# Patient Record
Sex: MALE | Race: WHITE
[De-identification: names, ages, dates, MRNs, and addresses within clinical notes are randomized per-mention and may not be internally consistent; named-entity substitution may affect disease eponyms.]

---

## 2017-07-03 ENCOUNTER — HOSPITAL ENCOUNTER (OUTPATIENT)
Dept: HOSPITAL 33 - ED | Age: 77
Setting detail: OBSERVATION
LOS: 2 days | Discharge: HOME | End: 2017-07-05
Attending: INTERNAL MEDICINE | Admitting: INTERNAL MEDICINE
Payer: MEDICARE

## 2017-07-03 DIAGNOSIS — N28.9: ICD-10-CM

## 2017-07-03 DIAGNOSIS — R53.1: ICD-10-CM

## 2017-07-03 DIAGNOSIS — R10.9: Primary | ICD-10-CM

## 2017-07-03 DIAGNOSIS — I10: ICD-10-CM

## 2017-07-03 DIAGNOSIS — M19.90: ICD-10-CM

## 2017-07-03 DIAGNOSIS — K21.9: ICD-10-CM

## 2017-07-03 DIAGNOSIS — N30.00: ICD-10-CM

## 2017-07-03 DIAGNOSIS — Z79.01: ICD-10-CM

## 2017-07-03 DIAGNOSIS — Z79.899: ICD-10-CM

## 2017-07-03 DIAGNOSIS — I48.91: ICD-10-CM

## 2017-07-03 DIAGNOSIS — Z85.038: ICD-10-CM

## 2017-07-03 LAB
ALBUMIN SERPL-MCNC: 4 G/DL (ref 3.4–5)
ALP SERPL-CCNC: 164 U/L (ref 46–116)
ALT SERPL-CCNC: 24 U/L (ref 12–78)
ANION GAP SERPL CALC-SCNC: 18.9 MEQ/L (ref 5–15)
APTT PPP: 38.4 SECONDS (ref 24.1–36.1)
AST SERPL QL: 20 U/L (ref 15–37)
BACTERIA UR CULT: YES
BASOPHILS NFR BLD AUTO: 0.3 % (ref 0–0.4)
BILIRUB BLD-MCNC: 0.8 MG/DL (ref 0.2–1)
BUN SERPL-MCNC: 23 MG/DL (ref 9–20)
CHLORIDE SERPL-SCNC: 104 MEQ/L (ref 98–107)
CO2 SERPL-SCNC: 25.5 MEQ/L (ref 21–32)
COLLECTION TYPE: (no result)
COMPLETE URINE MICROSCOPIC?: YES
GLUCOSE SERPL-MCNC: 146 MG/DL (ref 70–110)
GLUCOSE UR-MCNC: NEGATIVE MG/DL
INR PPP: 1.33 (ref 0.8–3)
LIPASE SERPL-CCNC: 118 U/L (ref 73–393)
MCH RBC QN AUTO: 29.5 PG (ref 26–32)
NEUTROPHILS NFR BLD AUTO: 56.1 % (ref 36–66)
PLATELET # BLD AUTO: 212 K/MM3 (ref 150–450)
POTASSIUM SERPLBLD-SCNC: 4.5 MEQ/L (ref 3.5–5.1)
PROT SERPL-MCNC: 7.8 GM/DL (ref 6.4–8.2)
PROTHROMBIN TIME: 15.1 SECONDS (ref 8.83–12.87)
RBC # BLD AUTO: 5.79 M/MM3 (ref 4.1–5.6)
SODIUM SERPL-SCNC: 144 MEQ/L (ref 136–145)
TROPONIN T SERPL HS-MCNC: < 0.017 NG/ML (ref 0–0.06)
WBC # BLD AUTO: 7.1 K/MM3 (ref 4–10.5)

## 2017-07-03 PROCEDURE — 71010: CPT

## 2017-07-03 PROCEDURE — 96374 THER/PROPH/DIAG INJ IV PUSH: CPT

## 2017-07-03 PROCEDURE — 93005 ELECTROCARDIOGRAM TRACING: CPT

## 2017-07-03 PROCEDURE — 74176 CT ABD & PELVIS W/O CONTRAST: CPT

## 2017-07-03 PROCEDURE — 96365 THER/PROPH/DIAG IV INF INIT: CPT

## 2017-07-03 PROCEDURE — 80053 COMPREHEN METABOLIC PANEL: CPT

## 2017-07-03 PROCEDURE — 85730 THROMBOPLASTIN TIME PARTIAL: CPT

## 2017-07-03 PROCEDURE — 81000 URINALYSIS NONAUTO W/SCOPE: CPT

## 2017-07-03 PROCEDURE — 85027 COMPLETE CBC AUTOMATED: CPT

## 2017-07-03 PROCEDURE — 82272 OCCULT BLD FECES 1-3 TESTS: CPT

## 2017-07-03 PROCEDURE — 86901 BLOOD TYPING SEROLOGIC RH(D): CPT

## 2017-07-03 PROCEDURE — 96360 HYDRATION IV INFUSION INIT: CPT

## 2017-07-03 PROCEDURE — 85610 PROTHROMBIN TIME: CPT

## 2017-07-03 PROCEDURE — 93268 ECG RECORD/REVIEW: CPT

## 2017-07-03 PROCEDURE — 36000 PLACE NEEDLE IN VEIN: CPT

## 2017-07-03 PROCEDURE — 84484 ASSAY OF TROPONIN QUANT: CPT

## 2017-07-03 PROCEDURE — 86900 BLOOD TYPING SEROLOGIC ABO: CPT

## 2017-07-03 PROCEDURE — 36415 COLL VENOUS BLD VENIPUNCTURE: CPT

## 2017-07-03 PROCEDURE — 85025 COMPLETE CBC W/AUTO DIFF WBC: CPT

## 2017-07-03 PROCEDURE — 86850 RBC ANTIBODY SCREEN: CPT

## 2017-07-03 PROCEDURE — 87040 BLOOD CULTURE FOR BACTERIA: CPT

## 2017-07-03 PROCEDURE — 87086 URINE CULTURE/COLONY COUNT: CPT

## 2017-07-03 PROCEDURE — 83690 ASSAY OF LIPASE: CPT

## 2017-07-03 PROCEDURE — 83605 ASSAY OF LACTIC ACID: CPT

## 2017-07-03 PROCEDURE — 83880 ASSAY OF NATRIURETIC PEPTIDE: CPT

## 2017-07-03 PROCEDURE — 99285 EMERGENCY DEPT VISIT HI MDM: CPT

## 2017-07-03 RX ADMIN — SIMVASTATIN SCH MG: 20 TABLET, FILM COATED ORAL at 22:04

## 2017-07-03 RX ADMIN — AMPICILLIN SODIUM AND SULBACTAM SODIUM SCH MLS/HR: 1; .5 INJECTION, POWDER, FOR SOLUTION INTRAMUSCULAR; INTRAVENOUS at 17:19

## 2017-07-03 RX ADMIN — HYDROCODONE BITARTRATE AND ACETAMINOPHEN PRN TAB: 5; 325 TABLET ORAL at 22:05

## 2017-07-03 RX ADMIN — APIXABAN SCH MG: 5 TABLET, FILM COATED ORAL at 22:04

## 2017-07-03 RX ADMIN — FAMOTIDINE SCH MG: 10 INJECTION INTRAVENOUS at 22:07

## 2017-07-03 NOTE — XRAY
Indication: Upper abdominal pain and weakness pain



Multiple contiguous axial images obtained through the abdomen and pelvis

without contrast as ordered.



Comparison: September 22, 2015.



Lung bases again demonstrates minimal bibasilar dependent atelectasis and left

costophrenic angle calcified granuloma.  There remains also large hiatal

hernia with partial intrathoracic stomach.  Heart is not enlarged.



Noncontrasted stomach and bowel loops appear nonobstructed.  There remains

diffuse scattered colonic diverticulosis without diverticulitis.  No free

fluid/air.  Interval enlarging left mid renal cortical cyst today 2.1 cm,

previously 1.1 cm.  Stable scattered hepatic/splenic calcified granulomas,

subcentimeter right lobe hepatic cyst, benign chunky prostate calcifications,

and cholecystectomy.



Remaining liver, pancreas, spleen, adrenal glands, kidneys, ureters, and

bladder appear unremarkable for noncontrast exam.  There remains extensive

aortoiliac calcifications and 4.4 cm distal AAA.



Osseous structures again demonstrates right total hip arthroplasty, mild

degenerative changes throughout the spine, bilateral L5 spondylolysis with

minimal grade 1 spondylolisthesis, and mixed lytic/sclerotic appearing sacrum.



Impression:

1.  Interval enlarging 2.1 cm left renal cortical cyst.  Ultrasound may yield

further information if there remains further clinical concern.

2.  Stable large hiatal hernia with partial intrathoracic stomach, diffuse

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

disease.

3.  Stable L5 spondylolysis with minimal spondylolisthesis.

3.  Stable mixed lytic/sclerotic appearing sacrum.  Again metastasis not

completely excluded in this patient with previous reported history of

colorectal cancer.



CT DI 22.27

## 2017-07-03 NOTE — ERPHSYRPT
- History of Present Illness


Time Seen by Provider: 07/03/17 08:47


Source: patient, family (wife)


Patient Subjective Stated Complaint: PT STATES HE HAS BEEN FEELING WEAK FOR THE 

PAST FEW DAYS. SOB WITH EXERTION. DENIES ANY COUGH.


Triage Nursing Assessment: PT PALE, WARM, DRY. LUNG SOUNDS DIMINISHED. PT 

AFEBRILE. ARRIVED IN WHEELCHAIR, TRANSFRED TO er COT WITHOUT DIFFICULTY.


Physician History: 





CC: weakness


Hx: 77 y/o patient of Dr Chamorro. He has felt general weakness for one week. 

Worse when upright and feels like might pass out. He has chronic black stools 

and takes iron. Hx of colon CA with recent normal colonoscopy. Prior renal 

insuff and AAA. Some abd pain. No vomiting. No chest pain. No fever or chills. 

He feels tired and fatigue. He is on eliquis for atrial fibrillation.





Severity: moderate


Associated Symptoms: shortness of breath


Allergies/Adverse Reactions: 








No Known Drug Allergies Allergy (Verified 07/03/17 09:04)


 





Home Medications: 








Fentanyl 25 mcg TOP Q72H 10/01/14 [History]


Hydrocodone Bit/Acetaminophen [Norco 5-325 Tablet] 1 tab PO Q4-6HPRN PRN 10/01/

14 [History]


Apixaban*** [Eliquis***] 5 mg PO BID 09/23/15 [History]


Ferrous Sulfate [Iron] 325 mg PO DAILY 09/23/15 [History]


Furosemide 40 mg PO DAILY 09/23/15 [History]


Amlodipine Besylate [Norvasc] 2.5 mg PO DAILY 07/03/17 [History]


Metoprolol Tartrate 25 mg*** [Lopressor 25MG Tab***] 25 mg PO DAILY 07/03/17 [

History]


Omeprazole 20 MG [Prilosec 20 mg] 20 mg PO DAILY 07/03/17 [History]


Rosuvastatin Calcium [Crestor] 10 mg PO HS 07/03/17 [History]


Valsartan [Diovan] 80 mg PO DAILY 07/03/17 [History]





Hx Tetanus, Diphtheria Vaccination/Date Given: Yes (UP TO DATE)


Hx Influenza Vaccination/Date Given: Yes


Hx Pneumococcal Vaccination/Date Given: Yes


Immunizations Up to Date: Yes





- Review of Systems


Constitutional: Fatigue, Malaise, Weakness, No Fever, No Chills


Eyes: No Symptoms


Ears, Nose, & Throat: No Symptoms


Respiratory: Dyspnea, No Cough


Cardiac: No Chest Pain, No Syncope


Abdominal/Gastrointestinal: Abdominal Pain, No Nausea, No Vomiting, No Diarrhea


Genitourinary Symptoms: No Dysuria


Musculoskeletal: No Back Pain


Skin: No Rash


Neurological: Dizziness, No Focal Weakness, No Headache, No Parasthesia


All Other Systems: Reviewed and Negative





- Past Medical History


Pertinent Past Medical History: Yes


Neurological History: No Pertinent History


ENT History: No Pertinent History


Cardiac History: Arrhythmia, High Cholesterol, Hypertension


Respiratory History: No Pertinent History


Endocrine Medical History: No Pertinent History


Musculoskeletal History: Arthritis, Other


GI Medical History: Gallbladder Disease, Other


 History: Renal Disease


Psycho-Social History: No Pertinent History


Male Reproductive Disorders: No Pertinent History


Other Medical History: ANEMIA,FX NECK, hip replacement, fused rt hip, fused rt 

ankle





- Past Surgical History


Past Surgical History: Yes


Neuro Surgical History: No Pertinent History


Cardiac: No Pertinent History


Respiratory: No Pertinent History


Gastrointestinal: Cholecystectomy, Hernia Repair, Other


Genitourinary: No Pertinent History


Musculoskeletal: Orthopedic Surgery


Male Surgical History: Vasectomy


Other Surgical History: HIP AND NECK SURG, latasha drains left after gallbladder 

removed, draining milky substance.





- Social History


Smoking Status: Former smoker


Exposure to second hand smoke: No


Drug Use: none


Patient Lives Alone: No ( here with wife)





- Nursing Vital Signs


Nursing Vital Signs: 


 Initial Vital Signs











Temperature                    99.2 F


 


Temperature Source             Rectal


 


Pulse Rate                     98


 


Respiratory Rate               18


 


Blood Pressure []              118/74


 


Pain Intensity                 0

















- Physical Exam


General Appearance: alert, other (pleasant man, mildly tachycardic when upright

, pale)


Eye Exam: PERRL/EOMI


Ears, Nose, Throat Exam: moist mucous membranes


Neck Exam: normal inspection, non-tender, supple


Respiratory Exam: diminished breath sounds


Cardiovascular Exam: irregular, No edema


Gastrointestinal/Abdomen Exam: soft, tenderness (RLQ, no mass, several scars)


Male Genitalia Exam: normal genitalia


Back Exam: normal inspection, No vertebral tenderness


Extremity Exam: normal inspection, normal range of motion


Neurologic Exam: alert, oriented x 3, cooperative, CNs II-XII nml as tested, 

sensation nml, No motor deficits


Skin Exam: warm, dry, pale, No rash





- Course


Nursing assessment & vital signs reviewed: Yes


EKG Interpreted by Me: RATE ([id number]), A-fib, NORMAL AXIS, NORMAL INTERVALS (QTc 469

), Non-specific ST Changes, Other (LVH)





- Radiology Exams


  ** cxr


X-ray Interpretation: Teleradiologist Report, Negative





- CT Exams


  ** abd/pelvis


CT Interpretation: Tele-radiologist Report (renal cyst, stable AAA, no acute 

findings)


Ordered Tests: 


 Active Orders 24 hr











 Category Date Time Status


 


 Clean Catch Urine Specimen STAT Care  07/03/17 08:56 Active


 


 EKG-ER Only STAT Care  07/03/17 08:56 Active


 


 IV Insertion STAT Care  07/03/17 08:56 Active


 


 IV Insertion-2nd Peripheral STAT Care  07/03/17 09:07 Active


 


 NPO (ED) STAT Care  07/03/17 08:56 Active


 


 Rectal Temperature STAT Care  07/03/17 08:58 Active


 


 ABDOMEN AND PELVIS W/0 CONTRAS [CT] Stat Exams  07/03/17 08:57 Completed


 


 CHEST 1 VIEW (PORTABLE) Stat Exams  07/03/17 08:57 Completed


 


 BLOOD CULTURE Stat Lab  07/03/17 09:20 Received


 


 CBC W DIFF Stat Lab  07/03/17 08:56 Completed


 


 CMP Stat Lab  07/03/17 09:00 Completed


 


 CULTURE,URINE Stat Lab  07/03/17 10:50 Received


 


 LIPASE Stat Lab  07/03/17 09:00 Completed


 


 Lactic Acid Stat Lab  07/03/17 09:03 Completed


 


 Lactic Acid Stat Lab  07/03/17 11:20 Completed


 


 NT PRO BNP Stat Lab  07/03/17 09:00 Completed


 


 Occult Blood,Stool Other Stat Lab  07/03/17 08:59 Completed


 


 PROTIME WITH INR Stat Lab  07/03/17 09:00 Completed


 


 PTT Stat Lab  07/03/17 09:00 Completed


 


 TROPONIN Stat Lab  07/03/17 09:00 Completed


 


 UA W/ MICROSCOPIC Stat Lab  07/03/17 10:50 Completed








Medication Summary











Generic Name Dose Route Start Last Admin





  Trade Name Freq  PRN Reason Stop Dose Admin


 


Sodium Chloride  1,000 mls @ 100 mls/hr  07/03/17 09:00  07/03/17 09:11





  Sodium Chloride 0.9% 1000 Ml  IV  08/02/17 08:59  100 mls/hr





  .Q10H JARDO   Administration














Discontinued Medications














Generic Name Dose Route Start Last Admin





  Trade Name Freq  PRN Reason Stop Dose Admin


 


Famotidine  20 mg  07/03/17 08:56  07/03/17 09:11





  Pepcid 20 Mg Vial***  IV  07/03/17 08:57  20 mg





  STAT ONE   Administration


 


Famotidine  Confirm  07/03/17 09:07  





  Pepcid 20 Mg Vial***  Administered  07/03/17 09:08  





  Dose   





  20 mg   





  IV   





  .STK-MED ONE   


 


Ampicillin Sodium/Sulbactam Sodium  1.5 gm in 100 mls @ 200 mls/hr  07/03/17 11:

09  07/03/17 11:12





  Unasyn 1.5gm / Nacl 100ml  IV  07/03/17 11:38  200 mls/hr





  STAT STA   Administration


 


Ampicillin Sodium/Sulbactam Sodium  Confirm  07/03/17 11:11  





  Unasyn 1.5gm / Nacl 100ml  Administered  07/03/17 11:12  





  Dose   





  1.5 gm in 100 mls @ ud   





  .ROUTE   





  .STK-MED ONE   











Lab/Rad Data: 


 Laboratory Result Diagrams





 07/03/17 08:56 





 07/03/17 09:00 





 Laboratory Results











  07/03/17 07/03/17 07/03/17 Range/Units





  11:20 10:50 09:03 


 


WBC     (4.0-10.5)  K/mm3


 


RBC     (4.1-5.6)  M/mm3


 


Hgb     (12.5-18.0)  gm/dl


 


Hct     (42-50)  %


 


MCV     ()  fl


 


MCH     (26-32)  pg


 


MCHC     (32-36)  g/dl


 


RDW     (11.5-14.0)  %


 


Plt Count     (150-450)  K/mm3


 


MPV     (6-9.5)  fl


 


Gran %     (36.0-66.0)  %


 


Lymphocytes %     (24.0-44.0)  %


 


Monocytes %     (0.0-12.0)  %


 


Eosinophils %     (0.00-5.0)  %


 


Basophils %     (0.0-0.4)  %


 


Basophils #     (0-0.4)  


 


INR     (0.8-3.0)  


 


APTT     (24.1-36.1)  SECONDS


 


Sodium     (136-145)  mEq/L


 


Potassium     (3.5-5.1)  mEq/L


 


Chloride     ()  mEq/L


 


Carbon Dioxide     (21-32)  mEq/L


 


Anion Gap     (5-15)  MEQ/L


 


BUN     (9-20)  mg/dL


 


Creatinine     (0.55-1.30)  mg/dl


 


Estimated GFR     ML/MIN


 


Glucose     ()  MG/DL


 


Lactic Acid  1.7   3.0 H  (0.4-2.0)  


 


Calcium     (8.5-10.1)  mg/dL


 


Total Bilirubin     (0.2-1.0)  mg/dL


 


AST     (15-37)  U/L


 


ALT     (12-78)  U/L


 


Alkaline Phosphatase     ()  U/L


 


Troponin I     (0.000-0.056)  ng/ml


 


NT-Pro-B Natriuret Pep     (0-450)  pg/ml


 


Serum Total Protein     (6.4-8.2)  gm/dL


 


Albumin     (3.4-5.0)  g/dL


 


Lipase     ()  U/L


 


Ur Collection Type   VOID   


 


Urine Color   DARK YELLOW   (YELLOW)  


 


Urine Appearance   CLEAR   (CLEAR)  


 


Urine pH   7.0   (5-6)  


 


Ur Specific Gravity   1.010   (1.005-1.025)  


 


Urine Protein   2+   (Negative)  


 


Urine Ketones   NEGATIVE   (NEGATIVE)  


 


Urine Blood   5-10   (0-5)  Werner/ul


 


Urine Nitrite   NEGATIVE   (NEGATIVE)  


 


Urine Bilirubin   MODERATE   (NEGATIVE)  


 


Urine Urobilinogen   4   (0-1)  mg/dL


 


Ur Leukocyte Esterase   1+   (NEGATIVE)  


 


Urine Microscopic RBC   0-2   (0-2)  /HPF


 


Urine Microscopic WBC   10-15   (0-5)  /HPF


 


Ur Epithelial Cells   FEW   (FEW)  /HPF


 


Urine Bacteria   FEW   (NEGATIVE)  /HPF


 


Hyaline Casts   0-2   (0-2)  /LPF


 


Urine Mucus   SLIGHT   (NEGATIVE)  /HPF


 


Urine Glucose   NEGATIVE   (NEGATIVE)  mg/dL


 


Stool Occult Blood     (Negative)  


 


Specimen Received   7/3/17 1050   


 


ABO Group     


 


Rh Factor     


 


Antibody Screen     (NEGATIVE)  














  07/03/17 07/03/17 07/03/17 Range/Units





  09:00 09:00 09:00 


 


WBC     (4.0-10.5)  K/mm3


 


RBC     (4.1-5.6)  M/mm3


 


Hgb     (12.5-18.0)  gm/dl


 


Hct     (42-50)  %


 


MCV     ()  fl


 


MCH     (26-32)  pg


 


MCHC     (32-36)  g/dl


 


RDW     (11.5-14.0)  %


 


Plt Count     (150-450)  K/mm3


 


MPV     (6-9.5)  fl


 


Gran %     (36.0-66.0)  %


 


Lymphocytes %     (24.0-44.0)  %


 


Monocytes %     (0.0-12.0)  %


 


Eosinophils %     (0.00-5.0)  %


 


Basophils %     (0.0-0.4)  %


 


Basophils #     (0-0.4)  


 


INR  1.33    (0.8-3.0)  


 


APTT  38.4 H    (24.1-36.1)  SECONDS


 


Sodium    144  (136-145)  mEq/L


 


Potassium    4.5  (3.5-5.1)  mEq/L


 


Chloride    104  ()  mEq/L


 


Carbon Dioxide    25.5  (21-32)  mEq/L


 


Anion Gap    18.9 H  (5-15)  MEQ/L


 


BUN    23 H  (9-20)  mg/dL


 


Creatinine    1.74 H  (0.55-1.30)  mg/dl


 


Estimated GFR    41  ML/MIN


 


Glucose    146 H  ()  MG/DL


 


Lactic Acid     (0.4-2.0)  


 


Calcium    10.1  (8.5-10.1)  mg/dL


 


Total Bilirubin    0.80  (0.2-1.0)  mg/dL


 


AST    20  (15-37)  U/L


 


ALT    24  (12-78)  U/L


 


Alkaline Phosphatase    164 H  ()  U/L


 


Troponin I    < 0.017  (0.000-0.056)  ng/ml


 


NT-Pro-B Natriuret Pep    4700 H  (0-450)  pg/ml


 


Serum Total Protein    7.8  (6.4-8.2)  gm/dL


 


Albumin    4.0  (3.4-5.0)  g/dL


 


Lipase    118  ()  U/L


 


Ur Collection Type     


 


Urine Color     (YELLOW)  


 


Urine Appearance     (CLEAR)  


 


Urine pH     (5-6)  


 


Ur Specific Gravity     (1.005-1.025)  


 


Urine Protein     (Negative)  


 


Urine Ketones     (NEGATIVE)  


 


Urine Blood     (0-5)  Werner/ul


 


Urine Nitrite     (NEGATIVE)  


 


Urine Bilirubin     (NEGATIVE)  


 


Urine Urobilinogen     (0-1)  mg/dL


 


Ur Leukocyte Esterase     (NEGATIVE)  


 


Urine Microscopic RBC     (0-2)  /HPF


 


Urine Microscopic WBC     (0-5)  /HPF


 


Ur Epithelial Cells     (FEW)  /HPF


 


Urine Bacteria     (NEGATIVE)  /HPF


 


Hyaline Casts     (0-2)  /LPF


 


Urine Mucus     (NEGATIVE)  /HPF


 


Urine Glucose     (NEGATIVE)  mg/dL


 


Stool Occult Blood     (Negative)  


 


Specimen Received     


 


ABO Group   O   


 


Rh Factor   POSITIVE   


 


Antibody Screen   NEGATIVE   (NEGATIVE)  














  07/03/17 07/03/17 Range/Units





  08:59 08:56 


 


WBC   7.1  (4.0-10.5)  K/mm3


 


RBC   5.79 H  (4.1-5.6)  M/mm3


 


Hgb   17.1  (12.5-18.0)  gm/dl


 


Hct   52.8 H  (42-50)  %


 


MCV   91.2  ()  fl


 


MCH   29.5  (26-32)  pg


 


MCHC   32.4  (32-36)  g/dl


 


RDW   13.5  (11.5-14.0)  %


 


Plt Count   212  (150-450)  K/mm3


 


MPV   10.6 H  (6-9.5)  fl


 


Gran %   56.1  (36.0-66.0)  %


 


Lymphocytes %   33.9  (24.0-44.0)  %


 


Monocytes %   7.3  (0.0-12.0)  %


 


Eosinophils %   2.4  (0.00-5.0)  %


 


Basophils %   0.3  (0.0-0.4)  %


 


Basophils #   0.02  (0-0.4)  


 


INR    (0.8-3.0)  


 


APTT    (24.1-36.1)  SECONDS


 


Sodium    (136-145)  mEq/L


 


Potassium    (3.5-5.1)  mEq/L


 


Chloride    ()  mEq/L


 


Carbon Dioxide    (21-32)  mEq/L


 


Anion Gap    (5-15)  MEQ/L


 


BUN    (9-20)  mg/dL


 


Creatinine    (0.55-1.30)  mg/dl


 


Estimated GFR    ML/MIN


 


Glucose    ()  MG/DL


 


Lactic Acid    (0.4-2.0)  


 


Calcium    (8.5-10.1)  mg/dL


 


Total Bilirubin    (0.2-1.0)  mg/dL


 


AST    (15-37)  U/L


 


ALT    (12-78)  U/L


 


Alkaline Phosphatase    ()  U/L


 


Troponin I    (0.000-0.056)  ng/ml


 


NT-Pro-B Natriuret Pep    (0-450)  pg/ml


 


Serum Total Protein    (6.4-8.2)  gm/dL


 


Albumin    (3.4-5.0)  g/dL


 


Lipase    ()  U/L


 


Ur Collection Type    


 


Urine Color    (YELLOW)  


 


Urine Appearance    (CLEAR)  


 


Urine pH    (5-6)  


 


Ur Specific Gravity    (1.005-1.025)  


 


Urine Protein    (Negative)  


 


Urine Ketones    (NEGATIVE)  


 


Urine Blood    (0-5)  Werner/ul


 


Urine Nitrite    (NEGATIVE)  


 


Urine Bilirubin    (NEGATIVE)  


 


Urine Urobilinogen    (0-1)  mg/dL


 


Ur Leukocyte Esterase    (NEGATIVE)  


 


Urine Microscopic RBC    (0-2)  /HPF


 


Urine Microscopic WBC    (0-5)  /HPF


 


Ur Epithelial Cells    (FEW)  /HPF


 


Urine Bacteria    (NEGATIVE)  /HPF


 


Hyaline Casts    (0-2)  /LPF


 


Urine Mucus    (NEGATIVE)  /HPF


 


Urine Glucose    (NEGATIVE)  mg/dL


 


Stool Occult Blood  NEGATIVE   (Negative)  


 


Specimen Received    


 


ABO Group    


 


Rh Factor    


 


Antibody Screen    (NEGATIVE)  














- Progress


Progress Note: 





07/03/17 11:57


Pt feels better. LActic improved. Called Dr Chamorro. Will place in obs for abtx 

and IVF. Pt and wife agree.





Discussed with .: Julio


Will see patient in: hospital (observation)


Counseled pt/family regarding: lab results, diagnosis, need for follow-up, rad 

results





- Departure


Time of Disposition: 11:57


Departure Disposition: Observation


Clinical Impression: 


 UTI (urinary tract infection), General weakness, Sepsis





Condition: Fair


Critical Care Time: No


Referrals: 


SHALOM CHAMORRO MD [Primary Care Provider] -

## 2017-07-03 NOTE — XRAY
Indication: Weakness.



Comparison: February 2, 2017.



Portable chest unchanged again hyperinflated with scattered calcified

granuloma and large hiatal hernia.  Heart is not enlarged.  Bony thorax intact

again with osteopenia, degenerative changes, old left clavicle fracture, and

left shoulder surgery.  No new/acute findings.



Impression: Stable nonacute chest with chronic features.

## 2017-07-04 LAB
ALBUMIN SERPL-MCNC: 3.3 G/DL (ref 3.4–5)
ALP SERPL-CCNC: 133 U/L (ref 46–116)
ALT SERPL-CCNC: 13 U/L (ref 12–78)
ANION GAP SERPL CALC-SCNC: 14.8 MEQ/L (ref 5–15)
AST SERPL QL: 13 U/L (ref 15–37)
BASOPHILS NFR BLD AUTO: 0.4 % (ref 0–0.4)
BILIRUB BLD-MCNC: 0.6 MG/DL (ref 0.2–1)
BUN SERPL-MCNC: 17 MG/DL (ref 9–20)
CHLORIDE SERPL-SCNC: 109 MEQ/L (ref 98–107)
CO2 SERPL-SCNC: 24.9 MEQ/L (ref 21–32)
GLUCOSE SERPL-MCNC: 106 MG/DL (ref 70–110)
MCH RBC QN AUTO: 30 PG (ref 26–32)
NEUTROPHILS NFR BLD AUTO: 58.3 % (ref 36–66)
PLATELET # BLD AUTO: 169 K/MM3 (ref 150–450)
POTASSIUM SERPLBLD-SCNC: 4.2 MEQ/L (ref 3.5–5.1)
PROT SERPL-MCNC: 6.2 GM/DL (ref 6.4–8.2)
RBC # BLD AUTO: 4.83 M/MM3 (ref 4.1–5.6)
SODIUM SERPL-SCNC: 145 MEQ/L (ref 136–145)
WBC # BLD AUTO: 5.5 K/MM3 (ref 4–10.5)

## 2017-07-04 RX ADMIN — AMPICILLIN SODIUM AND SULBACTAM SODIUM SCH MLS/HR: 1; .5 INJECTION, POWDER, FOR SOLUTION INTRAMUSCULAR; INTRAVENOUS at 05:22

## 2017-07-04 RX ADMIN — AMPICILLIN SODIUM AND SULBACTAM SODIUM SCH MLS/HR: 1; .5 INJECTION, POWDER, FOR SOLUTION INTRAMUSCULAR; INTRAVENOUS at 23:19

## 2017-07-04 RX ADMIN — FERROUS SULFATE TAB 325 MG (65 MG ELEMENTAL FE) SCH MG: 325 (65 FE) TAB at 09:10

## 2017-07-04 RX ADMIN — HYDROCODONE BITARTRATE AND ACETAMINOPHEN PRN TAB: 5; 325 TABLET ORAL at 21:37

## 2017-07-04 RX ADMIN — SIMVASTATIN SCH MG: 20 TABLET, FILM COATED ORAL at 21:35

## 2017-07-04 RX ADMIN — PANTOPRAZOLE SODIUM SCH MG: 40 TABLET, DELAYED RELEASE ORAL at 09:10

## 2017-07-04 RX ADMIN — VALSARTAN SCH MG: 80 TABLET ORAL at 09:09

## 2017-07-04 RX ADMIN — FUROSEMIDE SCH MG: 40 TABLET ORAL at 09:10

## 2017-07-04 RX ADMIN — FAMOTIDINE SCH MG: 10 INJECTION INTRAVENOUS at 09:16

## 2017-07-04 RX ADMIN — APIXABAN SCH MG: 5 TABLET, FILM COATED ORAL at 09:09

## 2017-07-04 RX ADMIN — APIXABAN SCH MG: 5 TABLET, FILM COATED ORAL at 21:35

## 2017-07-04 RX ADMIN — FAMOTIDINE SCH MG: 10 INJECTION INTRAVENOUS at 21:35

## 2017-07-04 RX ADMIN — AMLODIPINE BESYLATE SCH MG: 5 TABLET ORAL at 09:09

## 2017-07-04 RX ADMIN — AMPICILLIN SODIUM AND SULBACTAM SODIUM SCH MLS/HR: 1; .5 INJECTION, POWDER, FOR SOLUTION INTRAMUSCULAR; INTRAVENOUS at 17:43

## 2017-07-04 RX ADMIN — METOPROLOL TARTRATE SCH MG: 25 TABLET, FILM COATED ORAL at 09:09

## 2017-07-04 RX ADMIN — AMPICILLIN SODIUM AND SULBACTAM SODIUM SCH MLS/HR: 1; .5 INJECTION, POWDER, FOR SOLUTION INTRAMUSCULAR; INTRAVENOUS at 00:09

## 2017-07-04 RX ADMIN — AMPICILLIN SODIUM AND SULBACTAM SODIUM SCH MLS/HR: 1; .5 INJECTION, POWDER, FOR SOLUTION INTRAMUSCULAR; INTRAVENOUS at 11:27
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
## 2017-07-05 VITALS — OXYGEN SATURATION: 95 % | SYSTOLIC BLOOD PRESSURE: 118 MMHG | DIASTOLIC BLOOD PRESSURE: 71 MMHG | HEART RATE: 68 BPM

## 2017-07-05 LAB
ALBUMIN SERPL-MCNC: 3.3 G/DL (ref 3.4–5)
ALP SERPL-CCNC: 133 U/L (ref 46–116)
ALT SERPL-CCNC: 16 U/L (ref 12–78)
ANION GAP SERPL CALC-SCNC: 14.9 MEQ/L (ref 5–15)
AST SERPL QL: 18 U/L (ref 15–37)
BILIRUB BLD-MCNC: 0.6 MG/DL (ref 0.2–1)
BUN SERPL-MCNC: 11 MG/DL (ref 9–20)
CHLORIDE SERPL-SCNC: 109 MEQ/L (ref 98–107)
CO2 SERPL-SCNC: 23.9 MEQ/L (ref 21–32)
GLUCOSE SERPL-MCNC: 105 MG/DL (ref 70–110)
MCH RBC QN AUTO: 29.9 PG (ref 26–32)
PLATELET # BLD AUTO: 157 K/MM3 (ref 150–450)
POTASSIUM SERPLBLD-SCNC: 3.5 MEQ/L (ref 3.5–5.1)
PROT SERPL-MCNC: 6.2 GM/DL (ref 6.4–8.2)
RBC # BLD AUTO: 4.88 M/MM3 (ref 4.1–5.6)
SODIUM SERPL-SCNC: 144 MEQ/L (ref 136–145)
WBC # BLD AUTO: 4.8 K/MM3 (ref 4–10.5)

## 2017-07-05 RX ADMIN — AMPICILLIN SODIUM AND SULBACTAM SODIUM SCH MLS/HR: 1; .5 INJECTION, POWDER, FOR SOLUTION INTRAMUSCULAR; INTRAVENOUS at 05:42

## 2017-07-05 RX ADMIN — METOPROLOL TARTRATE SCH MG: 25 TABLET, FILM COATED ORAL at 10:12

## 2017-07-05 RX ADMIN — FAMOTIDINE SCH MG: 10 INJECTION INTRAVENOUS at 10:15

## 2017-07-05 RX ADMIN — FUROSEMIDE SCH MG: 40 TABLET ORAL at 10:12

## 2017-07-05 RX ADMIN — APIXABAN SCH MG: 5 TABLET, FILM COATED ORAL at 10:11

## 2017-07-05 RX ADMIN — VALSARTAN SCH MG: 80 TABLET ORAL at 10:12

## 2017-07-05 RX ADMIN — PANTOPRAZOLE SODIUM SCH MG: 40 TABLET, DELAYED RELEASE ORAL at 10:12

## 2017-07-05 RX ADMIN — AMLODIPINE BESYLATE SCH MG: 5 TABLET ORAL at 10:12

## 2017-07-05 RX ADMIN — AMPICILLIN SODIUM AND SULBACTAM SODIUM SCH MLS/HR: 1; .5 INJECTION, POWDER, FOR SOLUTION INTRAMUSCULAR; INTRAVENOUS at 11:39

## 2017-07-05 RX ADMIN — HYDROCODONE BITARTRATE AND ACETAMINOPHEN PRN TAB: 5; 325 TABLET ORAL at 10:14

## 2017-07-05 RX ADMIN — FERROUS SULFATE TAB 325 MG (65 MG ELEMENTAL FE) SCH MG: 325 (65 FE) TAB at 10:16

## 2017-07-05 NOTE — PCM.DS
Discharge Summary


Date of Admission: 


07/03/17 12:11





Admitting Physician: 


SHALOM CHAMORRO





Primary Care Provider: 


SHALOM CHAMORRO








Allergies


Allergies





No Known Drug Allergies Allergy (Verified 07/03/17 12:30)


 











Hospital Summary





- Hospital Course


Hospital Course: 








 Chief Complaint





Diagnosis                        Weakness for 2-3 days





 Allergies











Allergy/AdvReac Type Severity Reaction Status Date / Time


 


No Known Drug Allergies Allergy   Verified 07/03/17 12:30








 Vital Signs (Last 24 hours)











  Temp Pulse Resp BP Pulse Ox


 


 07/05/17 12:00  98.3 F  68  18  118/71  95


 


 07/05/17 08:00    20  


 


 07/05/17 07:06  98.1 F  80  20  182/78  96


 


 07/05/17 04:00    18  


 


 07/05/17 03:59  97.9 F  75  18  111/69  94 L


 


 07/05/17 00:00    18  


 


 07/04/17 23:50  97.8 F  69  18  159/80  94 L


 


 07/04/17 20:00    19  


 


 07/04/17 19:46  98.2 F  72  19  137/86  93 L


 


 07/04/17 16:00  98.3 F  78  18  139/77  92 L








 Home Medications











 Medication  Instructions  Recorded  Confirmed  Last Taken  Type


 


Amlodipine Besylate [Norvasc] 2.5 mg PO DAILY 07/03/17 07/03/17 07/03/17 History


 


Metoprolol Tartrate 25 mg*** 25 mg PO DAILY 07/03/17 07/03/17 07/03/17 History





[Lopressor 25MG Tab***]     


 


Omeprazole 20 MG [Prilosec 20 mg] 20 mg PO DAILY 07/03/17 07/03/17 07/03/17 

History


 


Rosuvastatin Calcium [Crestor] 10 mg PO HS 07/03/17 07/03/17 07/02/17 History


 


Valsartan [Diovan] 80 mg PO DAILY 07/03/17 07/03/17 07/03/17 History








 Current Medications











Generic Name Dose Route Start Last Admin





  Trade Name Freq  PRN Reason Stop Dose Admin


 


Acetaminophen  650 mg  07/03/17 12:12  





  Tylenol 325 Mg***  PO  08/02/17 12:11  





  Q4H PRN PRN   





  PAIN AND/OR FEVER   


 


Hydrocodone Bitart/Acetaminophen  1 tab  07/03/17 20:32  07/05/17 10:14





  Norco 5/325 Mg***  PO  07/08/17 20:31  1 tab





  Q4H PRN PRN   Administration





  PAIN   


 


Amlodipine Besylate  2.5 mg  07/04/17 10:00  07/05/17 10:12





  Norvasc 5 Mg***  PO  08/03/17 09:59  2.5 mg





  DAILY JAROD   Administration


 


Apixaban  5 mg  07/03/17 22:00  07/05/17 10:11





  Eliquis***  PO  08/02/17 21:59  5 mg





  BID JAROD   Administration


 


Famotidine  20 mg  07/03/17 22:00  07/05/17 10:15





  Pepcid 20 Mg Vial***  IV  08/02/17 21:59  20 mg





  Q12HT JAROD   Administration


 


Fentanyl  25 mcg  07/04/17 10:00  07/04/17 09:10





  Duragesic 25mcg Patch***  TOP  07/09/17 09:59  25 mcg





  Q3D JAROD   Administration


 


Ferrous Sulfate  325 mg  07/04/17 10:00  07/05/17 10:16





  Feosol 325 Mg***  PO  08/03/17 09:59  325 mg





  DAILY JAROD   Administration


 


Furosemide  40 mg  07/04/17 10:00  07/05/17 10:12





  Lasix 40 Mg***  PO  08/03/17 09:59  40 mg





  DAILY JAROD   Administration


 


Ampicillin Sodium/Sulbactam Sodium  1.5 gm in 100 mls @ 200 mls/hr  07/03/17 18:

00  07/05/17 11:39





  Unasyn 1.5gm / Nacl 100ml  IV  08/02/17 17:59  200 mls/hr





  Q6HT JAROD   Administration


 


Sodium Chloride  1,000 mls @ 100 mls/hr  07/03/17 12:12  07/05/17 05:41





  Sodium Chloride 0.9% 1000 Ml  IV  08/02/17 12:11  100 mls/hr





  .Q10H JAROD   Administration


 


Metoprolol Tartrate  25 mg  07/04/17 10:00  07/05/17 10:12





  Lopressor 25mg Tab***  PO  08/03/17 09:59  25 mg





  DAILY JAROD   Administration


 


Pantoprazole Sodium  40 mg  07/04/17 10:00  07/05/17 10:12





  Protonix 40mg Tablet***  PO  08/03/17 09:59  40 mg





  DAILY JAROD   Administration


 


Simvastatin  20 mg  07/03/17 22:00  07/04/17 21:35





  Zocor 20mg**  PO  08/02/17 21:59  20 mg





  HS JAROD   Administration


 


Valsartan  80 mg  07/04/17 10:00  07/05/17 10:12





  Diovan 80 Mg***  PO  08/03/17 09:59  80 mg





  DAILY JAROD   Administration














Discontinued Medications














Generic Name Dose Route Start Last Admin





  Trade Name Freq  PRN Reason Stop Dose Admin


 


Famotidine  20 mg  07/03/17 08:56  07/03/17 09:11





  Pepcid 20 Mg Vial***  IV  07/03/17 08:57  20 mg





  STAT ONE   Administration


 


Famotidine  Confirm  07/03/17 09:07  





  Pepcid 20 Mg Vial***  Administered  07/03/17 09:08  





  Dose   





  20 mg   





  IV   





  .STK-MED ONE   


 


Sodium Chloride  1,000 mls @ 100 mls/hr  07/03/17 09:00  07/03/17 09:11





  Sodium Chloride 0.9% 1000 Ml  IV  08/02/17 08:59  100 mls/hr





  .Q10H JAROD   Administration


 


Ampicillin Sodium/Sulbactam Sodium  1.5 gm in 100 mls @ 200 mls/hr  07/03/17 11:

09  07/03/17 11:12





  Unasyn 1.5gm / Nacl 100ml  IV  07/03/17 11:38  200 mls/hr





  STAT STA   Administration


 


Ampicillin Sodium/Sulbactam Sodium  Confirm  07/03/17 11:11  





  Unasyn 1.5gm / Nacl 100ml  Administered  07/03/17 11:12  





  Dose   





  1.5 gm in 100 mls @ ud   





  .ROUTE   





  .STK-MED ONE   


 


Sodium Chloride  Confirm  07/03/17 09:08  





  Sodium Chloride 0.9% 1000 Ml  Administered  07/03/17 09:09  





  Dose   





  1,000 mls @ ud   





  .ROUTE   





  .STK-MED ONE   








 Intake & Output (Last 24 hours)











 07/03/17 07/04/17 07/05/17 07/06/17





 11:59 11:59 11:59 11:59


 


Intake Total  7108 2546 240


 


Balance  2498 2546 240


 


Weight 104.326 kg 91.654 kg  








 Microbiology Results (Last 24 hours)





07/03/17 10:50   Urine, Void    - Final


                            NO GROWTH





 Laboratory Results (Last 24 hours)











  07/05/17 07/05/17





  09:54 09:54


 


WBC   4.8


 


RBC   4.88


 


Hgb   14.6


 


Hct   44.2


 


MCV   90.6


 


MCH   29.9


 


MCHC   33.0


 


RDW   12.9


 


Plt Count   157


 


MPV   10.5 H


 


Sodium  144 


 


Potassium  3.5 


 


Chloride  109 H 


 


Carbon Dioxide  23.9 


 


Anion Gap  14.9 


 


BUN  11 


 


Creatinine  1.20 


 


Estimated GFR  > 60 


 


Glucose  105 


 


Calcium  8.7 


 


Total Bilirubin  0.60 


 


AST  18 


 


ALT  16 


 


Alkaline Phosphatase  133 H 


 


Serum Total Protein  6.2 L 


 


Albumin  3.3 L 








 Orders (Last 24 hours)











 Category Date Time Status


 


 Regular Diet Diet  07/05/17 Breakfast Active


 


 CBC Urgent Lab  07/05/17 09:54 Completed


 


 CMP Urgent Lab  07/05/17 09:54 Completed














- Vitals & Intake/Output


Vital Signs: 





 Vital Signs











Temperature  98.3 F   07/05/17 12:00


 


Pulse Rate  68   07/05/17 12:00


 


Respiratory Rate  18   07/05/17 12:00


 


Blood Pressure  118/71   07/05/17 12:00


 


O2 Sat by Pulse Oximetry  95   07/05/17 12:00











Intake & Output: 





 Intake & Output











 07/03/17 07/04/17 07/05/17 07/06/17





 11:59 11:59 11:59 11:59


 


Intake Total  2498 2546 240


 


Balance  2498 2546 240


 


Weight  91.654 kg  














- Lab


Result Diagrams: 


 07/05/17 09:54





 07/05/17 09:54


Lab Results-Last 24 Hrs: 





 Lab Results-Last 24 Hours











  07/05/17 07/05/17 Range/Units





  09:54 09:54 


 


WBC  4.8   (4.0-10.5)  K/mm3


 


RBC  4.88   (4.1-5.6)  M/mm3


 


Hgb  14.6   (12.5-18.0)  gm/dl


 


Hct  44.2   (42-50)  %


 


MCV  90.6   ()  fl


 


MCH  29.9   (26-32)  pg


 


MCHC  33.0   (32-36)  g/dl


 


RDW  12.9   (11.5-14.0)  %


 


Plt Count  157   (150-450)  K/mm3


 


MPV  10.5 H   (6-9.5)  fl


 


Sodium   144  (136-145)  mEq/L


 


Potassium   3.5  (3.5-5.1)  mEq/L


 


Chloride   109 H  ()  mEq/L


 


Carbon Dioxide   23.9  (21-32)  mEq/L


 


Anion Gap   14.9  (5-15)  MEQ/L


 


BUN   11  (9-20)  mg/dL


 


Creatinine   1.20  (0.55-1.30)  mg/dl


 


Estimated GFR   > 60  ML/MIN


 


Glucose   105  ()  MG/DL


 


Calcium   8.7  (8.5-10.1)  mg/dL


 


Total Bilirubin   0.60  (0.2-1.0)  mg/dL


 


AST   18  (15-37)  U/L


 


ALT   16  (12-78)  U/L


 


Alkaline Phosphatase   133 H  ()  U/L


 


Serum Total Protein   6.2 L  (6.4-8.2)  gm/dL


 


Albumin   3.3 L  (3.4-5.0)  g/dL














- Procedures and Test


Procedures and Tests throughout Hospitalization: 





 Therapy Orders & Screens





07/03/17 14:03


ST Screen per Nursing Assess 


   Comment: Protocol Order


   Physician Instructions: Greater than 5 points order ST Admission Screening


   Reason For Exam: Triggered on Admission


   Diagnosis: UTI, Sepsis, Weakness


   CVA/Dyshpagia/Aphasia: No


   Cognitive Deficits: No


   Dehydration/Nutrition Deficit: Yes


   Reflux: Yes


   Oral-Motor Difficulties: No


   Pneumonia: No


   Nursing Home Resident: No


   Total Points: 8














Discharge Exam


General Appearance: no apparent distress, alert


Neurologic Exam: alert, oriented x 3, cooperative, normal mood/affect, nml 

cerebellar function, sensation nml, No motor deficits


Skin Exam: normal color, warm, dry


Eye Exam: PERRL, EOMI, eyes nml inspection


Ears, Nose, Throat Exam: normal ENT inspection, pharynx normal, moist mucous 

membranes


Neck Exam: normal inspection, non-tender, supple, full range of motion


Respiratory Exam: normal breath sounds, lungs clear, No respiratory distress


Cardiovascular Exam: regular rate/rhythm, normal heart sounds


Gastrointestinal/Abdomen Exam: soft, No tenderness, No mass


Extremity Exam: normal inspection, normal range of motion


Back Exam: normal inspection, normal range of motion, No CVA tenderness, No 

vertebral tenderness


Male Genitalia Exam: deferred


Rectal Exam: deferred





Final Diagnosis/Problem List





- Final Discharge Diagnosis/Problem


(1) Abdominal pain


Current Visit: Yes   Status: Resolved   





(2) General weakness


Current Visit: Yes   Status: Resolved   





(3) UTI (urinary tract infection)


Current Visit: Yes   Status: Acute   


Assessment & Plan: 


will discharge home with cipro 500 mg po bid for 7 days








(4) GERD (gastroesophageal reflux disease)


Current Visit: Yes   Status: Chronic   


Assessment & Plan: 


will continue omeprazole and pepcid 20 mg po bid








- Discharge


Discharge Date: 07/05/17


Disposition: Home, Self-Care


Condition: Stable


Prescriptions: 


New


   Ciprofloxacin [Cipro 500 MG***] 500 mg PO BIDAC #15 tablet


   Famotidine 20 mg*** [Pepcid 20 MG***] 20 mg PO BID #60 tablet





Continue


   Hydrocodone Bit/Acetaminophen [Norco 5-325 Tablet] 1 tab PO Q4-6HPRN PRN


     PRN Reason: Pain


   Fentanyl 25 mcg TOP Q72H


   Ferrous Sulfate [Iron] 325 mg PO DAILY


   Furosemide 40 mg PO DAILY


   Apixaban*** [Eliquis***] 5 mg PO BID


   Rosuvastatin Calcium [Crestor] 10 mg PO HS


   Omeprazole 20 MG [Prilosec 20 mg] 20 mg PO DAILY


   Metoprolol Tartrate 25 mg*** [Lopressor 25MG Tab***] 25 mg PO DAILY


   Amlodipine Besylate [Norvasc] 2.5 mg PO DAILY


   Valsartan [Diovan] 80 mg PO DAILY


Follow up with: 


SHALOM CHAMORRO MD [Primary Care Provider] - 


Forms:  Patient Portal Information

## 2018-04-29 ENCOUNTER — HOSPITAL ENCOUNTER (EMERGENCY)
Dept: HOSPITAL 33 - ED | Age: 78
Discharge: ANOTHER HOSPITAL | End: 2018-04-29
Payer: MEDICARE

## 2018-04-29 VITALS — SYSTOLIC BLOOD PRESSURE: 129 MMHG | OXYGEN SATURATION: 95 % | DIASTOLIC BLOOD PRESSURE: 82 MMHG | HEART RATE: 139 BPM

## 2018-04-29 DIAGNOSIS — Z79.899: ICD-10-CM

## 2018-04-29 DIAGNOSIS — J86.9: ICD-10-CM

## 2018-04-29 DIAGNOSIS — K92.2: Primary | ICD-10-CM

## 2018-04-29 DIAGNOSIS — Z98.890: ICD-10-CM

## 2018-04-29 LAB
ABO GROUP BLD: (no result)
ALBUMIN SERPL-MCNC: 3 G/DL (ref 3.5–5)
ALP SERPL-CCNC: 142 U/L (ref 38–126)
ALT SERPL-CCNC: 13 U/L (ref 0–50)
ANION GAP SERPL CALC-SCNC: 14 MEQ/L (ref 5–15)
APTT PPP: 37.2 SECONDS (ref 24.1–36.1)
AST SERPL QL: 18 U/L (ref 17–59)
BASOPHILS # BLD AUTO: 0.01 10*3/UL (ref 0–0.4)
BASOPHILS NFR BLD AUTO: 0.1 % (ref 0–0.4)
BILIRUB BLD-MCNC: 0.3 MG/DL (ref 0.2–1.3)
BLD SMEAR INTERP: YES
BUN SERPL-MCNC: 21 MG/DL (ref 9–20)
CALCIUM SPEC-MCNC: 8.7 MG/DL (ref 8.4–10.2)
CHLORIDE SERPL-SCNC: 104 MMOL/L (ref 98–107)
CO2 SERPL-SCNC: 30 MMOL/L (ref 22–30)
CREAT SERPL-MCNC: 0.91 MG/DL (ref 0.66–1.25)
EOSINOPHIL # BLD AUTO: 0.03 10*3/UL (ref 0–0.5)
GLUCOSE SERPL-MCNC: 126 MG/DL (ref 74–106)
GLUCOSE UR-MCNC: NEGATIVE MG/DL
GRANULOCYTES # BLD AUTO: 5.49 10*3/UL (ref 1.4–6.9)
HCT VFR BLD AUTO: 29.4 % (ref 42–50)
HCT VFR BLD AUTO: 31.6 % (ref 42–50)
HGB BLD-MCNC: 8.3 GM/DL (ref 12.5–18)
HGB BLD-MCNC: 8.9 GM/DL (ref 12.5–18)
INR PPP: 1.27 (ref 0.8–3)
LYMPHOCYTES # SPEC AUTO: 1.21 10*3/UL (ref 1–4.6)
MCH RBC QN AUTO: 26.3 PG (ref 26–32)
MCHC RBC AUTO-ENTMCNC: 28.2 G/DL (ref 32–36)
MONOCYTES # BLD AUTO: 0.74 10*3/UL (ref 0–1.3)
NEUTROPHILS NFR BLD AUTO: 73.4 % (ref 36–66)
PLATELET # BLD AUTO: 504 K/MM3 (ref 150–450)
POTASSIUM SERPLBLD-SCNC: 5 MMOL/L (ref 3.5–5.1)
PROT SERPL-MCNC: 7.4 G/DL (ref 6.3–8.2)
PROT UR STRIP-MCNC: 100 MG/DL
RBC # BLD AUTO: 3.38 M/MM3 (ref 4.1–5.6)
RBC # URNS HPF: (no result) /HPF (ref 0–2)
RH BLD: POSITIVE
SODIUM SERPL-SCNC: 143 MMOL/L (ref 137–145)
WBC # BLD AUTO: 7.5 K/MM3 (ref 4–10.5)
WBC URNS QL MICRO: (no result) /HPF (ref 0–5)

## 2018-04-29 PROCEDURE — 99291 CRITICAL CARE FIRST HOUR: CPT

## 2018-04-29 PROCEDURE — 96361 HYDRATE IV INFUSION ADD-ON: CPT

## 2018-04-29 PROCEDURE — 85018 HEMOGLOBIN: CPT

## 2018-04-29 PROCEDURE — 80053 COMPREHEN METABOLIC PANEL: CPT

## 2018-04-29 PROCEDURE — 36430 TRANSFUSION BLD/BLD COMPNT: CPT

## 2018-04-29 PROCEDURE — 85610 PROTHROMBIN TIME: CPT

## 2018-04-29 PROCEDURE — 96360 HYDRATION IV INFUSION INIT: CPT

## 2018-04-29 PROCEDURE — 86900 BLOOD TYPING SEROLOGIC ABO: CPT

## 2018-04-29 PROCEDURE — 36415 COLL VENOUS BLD VENIPUNCTURE: CPT

## 2018-04-29 PROCEDURE — 85014 HEMATOCRIT: CPT

## 2018-04-29 PROCEDURE — 93041 RHYTHM ECG TRACING: CPT

## 2018-04-29 PROCEDURE — 83605 ASSAY OF LACTIC ACID: CPT

## 2018-04-29 PROCEDURE — 86850 RBC ANTIBODY SCREEN: CPT

## 2018-04-29 PROCEDURE — 36000 PLACE NEEDLE IN VEIN: CPT

## 2018-04-29 PROCEDURE — 86922 COMPATIBILITY TEST ANTIGLOB: CPT

## 2018-04-29 PROCEDURE — 85730 THROMBOPLASTIN TIME PARTIAL: CPT

## 2018-04-29 PROCEDURE — 85025 COMPLETE CBC W/AUTO DIFF WBC: CPT

## 2018-04-29 PROCEDURE — 87040 BLOOD CULTURE FOR BACTERIA: CPT

## 2018-04-29 PROCEDURE — 74177 CT ABD & PELVIS W/CONTRAST: CPT

## 2018-04-29 PROCEDURE — 96366 THER/PROPH/DIAG IV INF ADDON: CPT

## 2018-04-29 PROCEDURE — 81000 URINALYSIS NONAUTO W/SCOPE: CPT

## 2018-04-29 PROCEDURE — 99285 EMERGENCY DEPT VISIT HI MDM: CPT

## 2018-04-29 PROCEDURE — 96365 THER/PROPH/DIAG IV INF INIT: CPT

## 2018-04-29 PROCEDURE — 86901 BLOOD TYPING SEROLOGIC RH(D): CPT

## 2018-04-29 PROCEDURE — 71045 X-RAY EXAM CHEST 1 VIEW: CPT
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
## 2018-04-29 NOTE — XRAY
Indication: Vomiting.



Comparison: July 3, 2017.



Portable chest demonstrates new mild/moderate bibasilar

infiltrates/atelectasis/effusions, left greater than right.  Upper lungs

clear.  Heart is not enlarged for AP portable projection.  New GE junction

large caliber stent.  Bony thorax intact again with osteopenia and

degenerative changes.



Impression:

1.  New bibasilar infiltrates/atelectasis/effusion.  Correlate clinically.

2.  New GE junction large caliber stent.

## 2018-04-29 NOTE — XRAY
Indication: Vomiting blood.  Recent hiatal hernia surgery 2 weeks ago.



Multiple contiguous axial images obtained through the abdomen and pelvis using

80 cc Isovue 370 contrast only.



Comparison: July 3, 2017.



Lung bases demonstrates moderate bibasilar effusions and atelectasis.  Left

effusion demonstrates several tiny air bubbles concerning for infection.

Heart is not enlarged.  New gastroesophageal junction large caliber stent.



New PEG tube with balloon tip in the gastric lumen.  Noncontrasted stomach and

bowel loops appear nonobstructed.  Again scattered colonic diverticulosis,

greatest in the sigmoid colon.  Right hip prosthesis again produces extreme

beam artifact limiting these levels.  Stable left renal cyst, hepatic/splenic

calcified granulomas, benign chunky prostate calcifications, and

cholecystectomy.  No free fluid/air.



Remaining liver, pancreas, spleen, adrenal glands, kidneys, ureters, and

bladder appear unremarkable.  There remains extensive aortoiliac

calcifications and distal 4.7 cm fusiform aneurysm, previously 4.4 cm.



Osseous structures again demonstrates degenerative changes throughout the

spine, bilateral L5 spondylolysis with grade 1 spondylolisthesis, and mixed

lytic/sclerotic appearance sacrum.



Impression:

1.  New moderate bibasilar pleural effusions/atelectasis.  Left base effusion

demonstrates several air bubbles concerning for infection.

2.  New gastroesophageal junction large caliber stent and PEG tube.

3.  Minimally enlarging distal AAA.

4.  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

granulomatous disease, L5 spondylolysis with minimal spondylolisthesis, and

mixed lytic/sclerotic appearance sacrum.



Comment: Preliminary interpretation was made by [facility].  No critical discrepancy.



CTDI 23.12